# Patient Record
Sex: FEMALE | Race: WHITE | NOT HISPANIC OR LATINO | Employment: FULL TIME | URBAN - METROPOLITAN AREA
[De-identification: names, ages, dates, MRNs, and addresses within clinical notes are randomized per-mention and may not be internally consistent; named-entity substitution may affect disease eponyms.]

---

## 2017-09-14 ENCOUNTER — HOSPITAL ENCOUNTER (EMERGENCY)
Facility: HOSPITAL | Age: 62
Discharge: HOME | End: 2017-09-14
Attending: EMERGENCY MEDICINE | Admitting: EMERGENCY MEDICINE

## 2017-09-14 ENCOUNTER — APPOINTMENT (OUTPATIENT)
Dept: CT IMAGING | Facility: HOSPITAL | Age: 62
End: 2017-09-14

## 2017-09-14 VITALS
RESPIRATION RATE: 18 BRPM | HEART RATE: 77 BPM | TEMPERATURE: 97 F | OXYGEN SATURATION: 100 % | SYSTOLIC BLOOD PRESSURE: 151 MMHG | HEIGHT: 66 IN | BODY MASS INDEX: 25.71 KG/M2 | WEIGHT: 160 LBS | DIASTOLIC BLOOD PRESSURE: 79 MMHG

## 2017-09-14 DIAGNOSIS — M54.50 ACUTE LEFT-SIDED LOW BACK PAIN WITHOUT SCIATICA: Primary | ICD-10-CM

## 2017-09-14 DIAGNOSIS — R10.9 LEFT FLANK PAIN: ICD-10-CM

## 2017-09-14 DIAGNOSIS — R91.1 NODULE OF RIGHT LUNG: ICD-10-CM

## 2017-09-14 LAB
ALBUMIN SERPL-MCNC: 4.7 G/DL (ref 3.5–5.2)
ALBUMIN/GLOB SERPL: 1.7 G/DL
ALP SERPL-CCNC: 103 U/L (ref 39–117)
ALT SERPL W P-5'-P-CCNC: 28 U/L (ref 1–33)
ANION GAP SERPL CALCULATED.3IONS-SCNC: 11.2 MMOL/L
AST SERPL-CCNC: 20 U/L (ref 1–32)
BASOPHILS # BLD AUTO: 0.02 10*3/MM3 (ref 0–0.2)
BASOPHILS NFR BLD AUTO: 0.3 % (ref 0–1.5)
BILIRUB SERPL-MCNC: 0.4 MG/DL (ref 0.1–1.2)
BILIRUB UR QL STRIP: NEGATIVE
BUN BLD-MCNC: 10 MG/DL (ref 6–20)
BUN/CREAT SERPL: 11.8 (ref 7–25)
CALCIUM SPEC-SCNC: 9.8 MG/DL (ref 8.6–10.5)
CHLORIDE SERPL-SCNC: 103 MMOL/L (ref 98–107)
CLARITY UR: CLEAR
CO2 SERPL-SCNC: 27.8 MMOL/L (ref 22–29)
COLOR UR: YELLOW
CREAT BLD-MCNC: 0.85 MG/DL (ref 0.57–1)
DEPRECATED RDW RBC AUTO: 42.3 FL (ref 37–54)
EOSINOPHIL # BLD AUTO: 0.03 10*3/MM3 (ref 0–0.7)
EOSINOPHIL NFR BLD AUTO: 0.4 % (ref 0.3–6.2)
ERYTHROCYTE [DISTWIDTH] IN BLOOD BY AUTOMATED COUNT: 13.2 % (ref 11.7–13)
GFR SERPL CREATININE-BSD FRML MDRD: 68 ML/MIN/1.73
GLOBULIN UR ELPH-MCNC: 2.8 GM/DL
GLUCOSE BLD-MCNC: 107 MG/DL (ref 65–99)
GLUCOSE UR STRIP-MCNC: NEGATIVE MG/DL
HCT VFR BLD AUTO: 40.4 % (ref 35.6–45.5)
HGB BLD-MCNC: 14 G/DL (ref 11.9–15.5)
HGB UR QL STRIP.AUTO: NEGATIVE
IMM GRANULOCYTES # BLD: 0.04 10*3/MM3 (ref 0–0.03)
IMM GRANULOCYTES NFR BLD: 0.5 % (ref 0–0.5)
KETONES UR QL STRIP: NEGATIVE
LEUKOCYTE ESTERASE UR QL STRIP.AUTO: NEGATIVE
LIPASE SERPL-CCNC: 23 U/L (ref 13–60)
LYMPHOCYTES # BLD AUTO: 1.83 10*3/MM3 (ref 0.9–4.8)
LYMPHOCYTES NFR BLD AUTO: 25 % (ref 19.6–45.3)
MCH RBC QN AUTO: 30.3 PG (ref 26.9–32)
MCHC RBC AUTO-ENTMCNC: 34.7 G/DL (ref 32.4–36.3)
MCV RBC AUTO: 87.4 FL (ref 80.5–98.2)
MONOCYTES # BLD AUTO: 0.45 10*3/MM3 (ref 0.2–1.2)
MONOCYTES NFR BLD AUTO: 6.1 % (ref 5–12)
NEUTROPHILS # BLD AUTO: 4.96 10*3/MM3 (ref 1.9–8.1)
NEUTROPHILS NFR BLD AUTO: 67.7 % (ref 42.7–76)
NITRITE UR QL STRIP: NEGATIVE
PH UR STRIP.AUTO: 5.5 [PH] (ref 5–8)
PLATELET # BLD AUTO: 219 10*3/MM3 (ref 140–500)
PMV BLD AUTO: 10.2 FL (ref 6–12)
POTASSIUM BLD-SCNC: 4.2 MMOL/L (ref 3.5–5.2)
PROT SERPL-MCNC: 7.5 G/DL (ref 6–8.5)
PROT UR QL STRIP: NEGATIVE
RBC # BLD AUTO: 4.62 10*6/MM3 (ref 3.9–5.2)
SODIUM BLD-SCNC: 142 MMOL/L (ref 136–145)
SP GR UR STRIP: <=1.005 (ref 1–1.03)
UROBILINOGEN UR QL STRIP: NORMAL
WBC NRBC COR # BLD: 7.33 10*3/MM3 (ref 4.5–10.7)

## 2017-09-14 PROCEDURE — 85025 COMPLETE CBC W/AUTO DIFF WBC: CPT | Performed by: EMERGENCY MEDICINE

## 2017-09-14 PROCEDURE — 96374 THER/PROPH/DIAG INJ IV PUSH: CPT

## 2017-09-14 PROCEDURE — 25010000002 KETOROLAC TROMETHAMINE PER 15 MG: Performed by: EMERGENCY MEDICINE

## 2017-09-14 PROCEDURE — 81003 URINALYSIS AUTO W/O SCOPE: CPT | Performed by: EMERGENCY MEDICINE

## 2017-09-14 PROCEDURE — 74176 CT ABD & PELVIS W/O CONTRAST: CPT

## 2017-09-14 PROCEDURE — 83690 ASSAY OF LIPASE: CPT | Performed by: EMERGENCY MEDICINE

## 2017-09-14 PROCEDURE — 80053 COMPREHEN METABOLIC PANEL: CPT | Performed by: EMERGENCY MEDICINE

## 2017-09-14 PROCEDURE — 99283 EMERGENCY DEPT VISIT LOW MDM: CPT

## 2017-09-14 PROCEDURE — 25010000002 ONDANSETRON PER 1 MG: Performed by: EMERGENCY MEDICINE

## 2017-09-14 PROCEDURE — 96361 HYDRATE IV INFUSION ADD-ON: CPT

## 2017-09-14 PROCEDURE — 96375 TX/PRO/DX INJ NEW DRUG ADDON: CPT

## 2017-09-14 RX ORDER — KETOROLAC TROMETHAMINE 15 MG/ML
10 INJECTION, SOLUTION INTRAMUSCULAR; INTRAVENOUS ONCE
Status: COMPLETED | OUTPATIENT
Start: 2017-09-14 | End: 2017-09-14

## 2017-09-14 RX ORDER — CYCLOBENZAPRINE HCL 10 MG
10 TABLET ORAL 3 TIMES DAILY PRN
Qty: 21 TABLET | Refills: 0 | Status: SHIPPED | OUTPATIENT
Start: 2017-09-14 | End: 2017-09-14

## 2017-09-14 RX ORDER — CYCLOBENZAPRINE HCL 10 MG
10 TABLET ORAL 3 TIMES DAILY PRN
Qty: 21 TABLET | Refills: 0 | Status: SHIPPED | OUTPATIENT
Start: 2017-09-14

## 2017-09-14 RX ORDER — ONDANSETRON 2 MG/ML
4 INJECTION INTRAMUSCULAR; INTRAVENOUS ONCE
Status: COMPLETED | OUTPATIENT
Start: 2017-09-14 | End: 2017-09-14

## 2017-09-14 RX ADMIN — ONDANSETRON 4 MG: 2 INJECTION INTRAMUSCULAR; INTRAVENOUS at 11:05

## 2017-09-14 RX ADMIN — KETOROLAC TROMETHAMINE 10 MG: 15 INJECTION, SOLUTION INTRAMUSCULAR; INTRAVENOUS at 11:07

## 2017-09-14 RX ADMIN — SODIUM CHLORIDE 1000 ML: 9 INJECTION, SOLUTION INTRAVENOUS at 11:00

## 2017-09-14 NOTE — ED PROVIDER NOTES
Pt c/o lower left flank pain which began four days ago. Pt states that she vomited 4 days ago, but that it has since resolved. Pt denies any injury, fevers, chest pain, shortness of air, cough, alleviating or aggravating factors.     Awake alert, nontoxic appearing  Heart: RRR, 2/6 systolic murmur  Lungs: clear to auscultation  Abd: no tenderness, no CVA TTPalp  Skin: no rash    Discussed heart murmur heard on physical exam, CT scans which showed a lung nodule that could be abnormally developing cells such as cancer and will require follow up CT imaging to be arranged by the patient's PCP. Pt agrees with plan to discharge with medication and plan for close follow with PCP and cardiologist of her choice for further evaluation of her cardiac murmur. Pt should return to ED for persistent or worsening pain, vomiting, rash and fever.        I supervised care provided by the MLP. We have discussed the case. I have reviewed  the note and agree with the plan of treatment. I personally interviewed the patient and examined the patient    Documentation assistance provided by hardy Wilson.  Information recorded by the hardy was done at my direction and has been verified and validated by me.           Sarah Wilson  09/14/17 [id number]       Esme Delacruz MD  09/16/17 [id number]

## 2017-09-14 NOTE — DISCHARGE INSTRUCTIONS
You have a nodule in your right lung that needs to be rechecked by a CT scan of your chest in 6-9 months that would be ordered by your primary care provider.  Activities as tolerated.  Take the medications as prescribed when needed for pain.  It is extremely important that you establish a primary care provider and follow up with them for this problem and for regular health maintenance.  Return to the ER for fever, abdominal pain, uncontrollable nausea and vomiting, urinary retention, bowel or bladder incontinence, numbness in the groin or genital area, leg weakness, new or worsening symptoms, any concerns.

## 2017-09-14 NOTE — ED PROVIDER NOTES
EMERGENCY DEPARTMENT ENCOUNTER    CHIEF COMPLAINT  Chief Complaint: right back pain  History given by: patient   History limited by: nothing   Room Number: 35/35  PMD: No Known Provider    HPI:  Pt is a 61 y.o. female who presents with waxing and waning, left sided back pain that radiates to left, mid-abd for 4 days. The pain is there constantly and is not exacerbated or alleviated by anything. She describes the pain as stabbing; it was a 10/10 in severity last night, but she rates it as an 8/10 at this time. Pt also complains of nausea and one episode of vomiting that began around the same time as her back pain on Monday. She denies diarrhea, bowel/bladder incontinence, lower extremity weakness, saddle paresthesia, prior back injury, or recent heavy lifting. Pt had her urine tested at [facility] yesterday. She also received an injection of pain medicine yesterday without relief. She denies h/o diabetes or IV drug use. She has not seen a PCP in years and does not take any medication on a regular basis.     Duration: 4 days  Timing: constant   Location: right back  Radiation: radiates to right mid-abd  Quality: waxing and waning, stabbing   Intensity/Severity: moderate   Progression: unchanged   Associated Symptoms: nausea, vomiting   Aggravating Factors: none  Alleviating Factors:none  Previous Episodes: none specified   Treatment before arrival: none specified     MEDICAL RECORD REVIEW  No pertinent history in [place].     PAST MEDICAL HISTORY  Active Ambulatory Problems     Diagnosis Date Noted   • No Active Ambulatory Problems     Resolved Ambulatory Problems     Diagnosis Date Noted   • No Resolved Ambulatory Problems     No Additional Past Medical History       PAST SURGICAL HISTORY  Past Surgical History:   Procedure Laterality Date   • CHOLECYSTECTOMY     • CORONARY ANGIOPLASTY      with stent   • FRACTURE SURGERY  2000    hip repair       FAMILY HISTORY  History reviewed. No pertinent family  history.    SOCIAL HISTORY  Social History     Social History   • Marital status:      Spouse name: N/A   • Number of children: N/A   • Years of education: N/A     Occupational History   • Not on file.     Social History Main Topics   • Smoking status: Former Smoker   • Smokeless tobacco: Not on file   • Alcohol use No   • Drug use: No   • Sexual activity: Defer     Other Topics Concern   • Not on file     Social History Narrative   • No narrative on file       ALLERGIES  Penicillins    REVIEW OF SYSTEMS  Review of Systems   Constitutional: Negative for chills and fever.   HENT: Negative for congestion, sinus pressure, sore throat and voice change.    Eyes: Negative for pain and visual disturbance.   Respiratory: Negative for cough, chest tightness and shortness of breath.    Cardiovascular: Negative for chest pain and palpitations.   Gastrointestinal: Positive for abdominal pain (left, mid), nausea and vomiting (1 episode on Monday). Negative for diarrhea.   Genitourinary: Negative for dysuria and hematuria.   Musculoskeletal: Positive for back pain (left). Negative for myalgias, neck pain and neck stiffness.   Skin: Negative for rash.   Neurological: Negative for dizziness, weakness, light-headedness and headaches.   Psychiatric/Behavioral: Negative for agitation, confusion and suicidal ideas. The patient is not nervous/anxious.    All other systems reviewed and are negative.      PHYSICAL EXAM  ED Triage Vitals   Temp Heart Rate Resp BP SpO2   09/14/17 0914 09/14/17 0914 09/14/17 0914 -- 09/14/17 0914   98.1 °F (36.7 °C) 95 16  100 %      Temp src Heart Rate Source Patient Position BP Location FiO2 (%)   09/14/17 0914 09/14/17 0914 -- -- --   Tympanic Monitor          Physical Exam   Constitutional: She is oriented to person, place, and time and well-developed, well-nourished, and in no distress. No distress.   HENT:   Head: Normocephalic and atraumatic.   Left Ear: External ear normal.   Nose: Nose  normal.   Mouth/Throat: Uvula is midline, oropharynx is clear and moist and mucous membranes are normal.   Eyes: Conjunctivae and EOM are normal. Pupils are equal, round, and reactive to light. Right eye exhibits no discharge. Left eye exhibits no discharge. No scleral icterus.   Neck: Normal range of motion. Neck supple.   Cardiovascular: Normal rate, regular rhythm and intact distal pulses.    Murmur (2/6, systolic) heard.  Pulmonary/Chest: Effort normal and breath sounds normal. No respiratory distress. She has no wheezes. She has no rales.   Abdominal: Soft. Normal appearance and bowel sounds are normal. She exhibits no distension. There is no tenderness. There is no rigidity, no rebound, no guarding, no CVA tenderness, no tenderness at McBurney's point and negative Schroeder's sign.   Musculoskeletal: Normal range of motion.   No tenderness to the midline or paraspinal back in its entirety. No pain increase or assistance needed when the patient moved from the exam chair onto the stretcher. Sensation is intact to light touch throughout the bilateral lower extremities. Patellar and achilles reflexes are 2+ and equal bilaterally. 5/5 strength in the BLE with plantarflexion and great toe extension.   Neurological: She is alert and oriented to person, place, and time.   Skin: Skin is warm and dry.   Psychiatric: Affect normal.   Nursing note and vitals reviewed.      LAB RESULTS  Recent Results (from the past 24 hour(s))   Comprehensive Metabolic Panel    Collection Time: 09/14/17 10:32 AM   Result Value Ref Range    Glucose 107 (H) 65 - 99 mg/dL    BUN 10 6 - 20 mg/dL    Creatinine 0.85 0.57 - 1.00 mg/dL    Sodium 142 136 - 145 mmol/L    Potassium 4.2 3.5 - 5.2 mmol/L    Chloride 103 98 - 107 mmol/L    CO2 27.8 22.0 - 29.0 mmol/L    Calcium 9.8 8.6 - 10.5 mg/dL    Total Protein 7.5 6.0 - 8.5 g/dL    Albumin 4.70 3.50 - 5.20 g/dL    ALT (SGPT) 28 1 - 33 U/L    AST (SGOT) 20 1 - 32 U/L    Alkaline Phosphatase 103 39 -  117 U/L    Total Bilirubin 0.4 0.1 - 1.2 mg/dL    eGFR Non African Amer 68 >60 mL/min/1.73    Globulin 2.8 gm/dL    A/G Ratio 1.7 g/dL    BUN/Creatinine Ratio 11.8 7.0 - 25.0    Anion Gap 11.2 mmol/L   Lipase    Collection Time: 09/14/17 10:32 AM   Result Value Ref Range    Lipase 23 13 - 60 U/L   CBC Auto Differential    Collection Time: 09/14/17 10:32 AM   Result Value Ref Range    WBC 7.33 4.50 - 10.70 10*3/mm3    RBC 4.62 3.90 - 5.20 10*6/mm3    Hemoglobin 14.0 11.9 - 15.5 g/dL    Hematocrit 40.4 35.6 - 45.5 %    MCV 87.4 80.5 - 98.2 fL    MCH 30.3 26.9 - 32.0 pg    MCHC 34.7 32.4 - 36.3 g/dL    RDW 13.2 (H) 11.7 - 13.0 %    RDW-SD 42.3 37.0 - 54.0 fl    MPV 10.2 6.0 - 12.0 fL    Platelets 219 140 - 500 10*3/mm3    Neutrophil % 67.7 42.7 - 76.0 %    Lymphocyte % 25.0 19.6 - 45.3 %    Monocyte % 6.1 5.0 - 12.0 %    Eosinophil % 0.4 0.3 - 6.2 %    Basophil % 0.3 0.0 - 1.5 %    Immature Grans % 0.5 0.0 - 0.5 %    Neutrophils, Absolute 4.96 1.90 - 8.10 10*3/mm3    Lymphocytes, Absolute 1.83 0.90 - 4.80 10*3/mm3    Monocytes, Absolute 0.45 0.20 - 1.20 10*3/mm3    Eosinophils, Absolute 0.03 0.00 - 0.70 10*3/mm3    Basophils, Absolute 0.02 0.00 - 0.20 10*3/mm3    Immature Grans, Absolute 0.04 (H) 0.00 - 0.03 10*3/mm3   Urinalysis With / Culture If Indicated    Collection Time: 09/14/17 10:33 AM   Result Value Ref Range    Color, UA Yellow Yellow, Straw    Appearance, UA Clear Clear    pH, UA 5.5 5.0 - 8.0    Specific Gravity, UA <=1.005 1.005 - 1.030    Glucose, UA Negative Negative    Ketones, UA Negative Negative    Bilirubin, UA Negative Negative    Blood, UA Negative Negative    Protein, UA Negative Negative    Leuk Esterase, UA Negative Negative    Nitrite, UA Negative Negative    Urobilinogen, UA 0.2 E.U./dL 0.2 - 1.0 E.U./dL       I ordered the above labs and reviewed the results    RADIOLOGY  CT Abdomen Pelvis Without Contrast   Preliminary Result   1. No renal calculi or hydronephrosis.   2. Evidence of  cortical scarring in the superior pole of the right   kidney with a partly exophytic cyst. Lobulated soft tissue seen at this   level is favored to represent hypertrophic cortex.   3. Colonic diverticulosis.   4. 5 mm noncalcified right lower lobe nodule for which a followup CT   chest is recommended in 6 to 9 months to reevaluate.       These findings were discussed with Esme Delacruz by telephone at 10:55 AM on   09/14/2017.            CT abd/pelvis shows no obstruction. There is scarring at upper pole of right kidney and a lung nodule that will require f/u in 6 months.     I ordered the above noted radiological studies and reviewed the images on the PACS system.      COURSE & MEDICAL DECISION MAKING  Pertinent Labs and Imaging studies that were ordered and reviewed are noted above.  Results were reviewed/discussed with the patient and they were also made aware of online assess.  Pt also made aware that some labs, such as cultures, will not be resulted during ER visit and follow up with PMD is necessary.     PROGRESS AND CONSULTS    Progress Notes:    1010: Ordered CT abd/pelvis and labs for further evaluation. Also ordered 10 mg Toradol/singh for pain, and 1,000 cc fluid bolus for hydration.     1135: Reviewed pt's history and workup with Dr. Delacruz.  After a bedside evaluation; Dr. Delacruz agrees with the plan of care    1215: The patient's history, physical exam, and lab findings were discussed with the physician, who also performed a face to face history and physical exam.  I discussed all results and noted any abnormalities with patient. We discussed the lung nodule, and also the murmur noted on exam, which is low grade. She is not symptomatic and agrees to follow up with PCP for further evaluation of this. Discussed absoute need to recheck abnormalities with their family physician.  I answered any of the patient's questions.  Discussed plan for discharge, as there is no emergent indication for admission.  Pt is  "agreeable and understands need for follow up and repeat testing.  Pt is aware that discharge does not mean that nothing is wrong but it indicates no emergency is present and they must continue care with their family physician.  Pt is discharged with instructions to follow up with primary care doctor to have their blood pressure rechecked.       MEDICATIONS GIVEN IN ER  Medications   sodium chloride 0.9 % bolus 1,000 mL (1,000 mL Intravenous New Bag 9/14/17 1100)   ondansetron (ZOFRAN) injection 4 mg (4 mg Intravenous Given 9/14/17 1105)   ketorolac (TORADOL) injection 10 mg (10 mg Intravenous Given 9/14/17 1107)       /79  Pulse 77  Temp 97 °F (36.1 °C) (Tympanic)   Resp 16  Ht 66\" (167.6 cm)  Wt 160 lb (72.6 kg)  SpO2 100%  BMI 25.82 kg/m2      DIAGNOSIS  Final diagnoses:   Left flank pain   Nodule of right lung        FOLLOW UP   No Known Provider  [facility] [id number]    Schedule an appointment as soon as possible for a visit in 2 days      [facility] PHYSICAN REFERRAL [facility]  [facility] [address]  [phone]          RX     Medication List      New Prescriptions          cyclobenzaprine 10 MG tablet   Commonly known as:  FLEXERIL   Take 1 tablet by mouth 3 (Three) Times a Day As Needed for Muscle Spasms.       diclofenac 50 MG EC tablet   Commonly known as:  VOLTAREN   Take 1 tablet by mouth 2 (Two) Times a Day.           Documentation assistance provided by hardy Shane.  Information recorded by the hilarioiblinda was done at my direction and has been verified and validated by me.  Electronically signed by Laila Shane on 9/14/2017 at time 12:18 PM                           Laila Shane  09/14/17 0685       THADDEUS Moran  09/14/17 1334    "